# Patient Record
Sex: MALE | Race: BLACK OR AFRICAN AMERICAN | NOT HISPANIC OR LATINO | URBAN - METROPOLITAN AREA
[De-identification: names, ages, dates, MRNs, and addresses within clinical notes are randomized per-mention and may not be internally consistent; named-entity substitution may affect disease eponyms.]

---

## 2018-03-06 ENCOUNTER — OFFICE VISIT CONVERTED (OUTPATIENT)
Dept: INTERNAL MEDICINE | Facility: CLINIC | Age: 58
End: 2018-03-06
Attending: INTERNAL MEDICINE

## 2018-03-08 ENCOUNTER — OFFICE VISIT CONVERTED (OUTPATIENT)
Dept: PULMONOLOGY | Facility: CLINIC | Age: 58
End: 2018-03-08
Attending: INTERNAL MEDICINE

## 2018-05-31 ENCOUNTER — OFFICE VISIT CONVERTED (OUTPATIENT)
Dept: PULMONOLOGY | Facility: CLINIC | Age: 58
End: 2018-05-31
Attending: INTERNAL MEDICINE

## 2018-09-17 ENCOUNTER — OFFICE VISIT CONVERTED (OUTPATIENT)
Dept: INTERNAL MEDICINE | Facility: CLINIC | Age: 58
End: 2018-09-17
Attending: INTERNAL MEDICINE

## 2018-09-17 ENCOUNTER — CONVERSION ENCOUNTER (OUTPATIENT)
Dept: INTERNAL MEDICINE | Facility: CLINIC | Age: 58
End: 2018-09-17

## 2018-09-27 ENCOUNTER — OFFICE VISIT CONVERTED (OUTPATIENT)
Dept: PULMONOLOGY | Facility: CLINIC | Age: 58
End: 2018-09-27
Attending: INTERNAL MEDICINE

## 2018-10-25 ENCOUNTER — OFFICE VISIT CONVERTED (OUTPATIENT)
Dept: PULMONOLOGY | Facility: CLINIC | Age: 58
End: 2018-10-25
Attending: PHYSICIAN ASSISTANT

## 2018-11-06 ENCOUNTER — OFFICE VISIT CONVERTED (OUTPATIENT)
Dept: INTERNAL MEDICINE | Facility: CLINIC | Age: 58
End: 2018-11-06
Attending: INTERNAL MEDICINE

## 2018-11-28 ENCOUNTER — OFFICE VISIT CONVERTED (OUTPATIENT)
Dept: PULMONOLOGY | Facility: CLINIC | Age: 58
End: 2018-11-28
Attending: PHYSICIAN ASSISTANT

## 2018-12-03 ENCOUNTER — OFFICE VISIT CONVERTED (OUTPATIENT)
Dept: SURGERY | Facility: CLINIC | Age: 58
End: 2018-12-03
Attending: UROLOGY

## 2018-12-03 ENCOUNTER — CONVERSION ENCOUNTER (OUTPATIENT)
Dept: SURGERY | Facility: CLINIC | Age: 58
End: 2018-12-03

## 2018-12-18 ENCOUNTER — OFFICE VISIT CONVERTED (OUTPATIENT)
Dept: INTERNAL MEDICINE | Facility: CLINIC | Age: 58
End: 2018-12-18
Attending: PHYSICIAN ASSISTANT

## 2019-01-03 ENCOUNTER — HOSPITAL ENCOUNTER (OUTPATIENT)
Dept: CARDIOLOGY | Facility: HOSPITAL | Age: 59
Discharge: HOME | End: 2019-01-03
Attending: INTERNAL MEDICINE

## 2019-01-16 ENCOUNTER — HOSPITAL ENCOUNTER (OUTPATIENT)
Dept: INFUSION THERAPY | Facility: HOSPITAL | Age: 59
Setting detail: RECURRING SERIES
Discharge: HOME | End: 2019-01-31
Attending: INTERNAL MEDICINE

## 2019-01-25 ENCOUNTER — OFFICE VISIT CONVERTED (OUTPATIENT)
Dept: PULMONOLOGY | Facility: CLINIC | Age: 59
End: 2019-01-25
Attending: PHYSICIAN ASSISTANT

## 2019-02-04 ENCOUNTER — HOSPITAL ENCOUNTER (OUTPATIENT)
Dept: PULMONOLOGY | Facility: CLINIC | Age: 59
Setting detail: RECURRING SERIES
Discharge: HOME | End: 2019-03-03
Attending: INTERNAL MEDICINE

## 2019-02-13 ENCOUNTER — HOSPITAL ENCOUNTER (OUTPATIENT)
Dept: INFUSION THERAPY | Facility: HOSPITAL | Age: 59
Setting detail: RECURRING SERIES
Discharge: HOME | End: 2019-02-28
Attending: INTERNAL MEDICINE

## 2021-05-15 VITALS
HEIGHT: 64 IN | WEIGHT: 211.37 LBS | SYSTOLIC BLOOD PRESSURE: 122 MMHG | OXYGEN SATURATION: 93 % | HEART RATE: 116 BPM | TEMPERATURE: 97.6 F | BODY MASS INDEX: 36.09 KG/M2 | DIASTOLIC BLOOD PRESSURE: 68 MMHG | RESPIRATION RATE: 16 BRPM

## 2021-05-16 VITALS
BODY MASS INDEX: 35.26 KG/M2 | HEIGHT: 64 IN | TEMPERATURE: 97.1 F | WEIGHT: 206.5 LBS | DIASTOLIC BLOOD PRESSURE: 78 MMHG | HEART RATE: 83 BPM | OXYGEN SATURATION: 95 % | RESPIRATION RATE: 18 BRPM | SYSTOLIC BLOOD PRESSURE: 110 MMHG

## 2021-05-16 VITALS
DIASTOLIC BLOOD PRESSURE: 94 MMHG | OXYGEN SATURATION: 94 % | BODY MASS INDEX: 33.72 KG/M2 | HEIGHT: 64 IN | HEART RATE: 110 BPM | WEIGHT: 197.5 LBS | RESPIRATION RATE: 16 BRPM | SYSTOLIC BLOOD PRESSURE: 136 MMHG | TEMPERATURE: 100.3 F

## 2021-05-16 VITALS
OXYGEN SATURATION: 95 % | TEMPERATURE: 97.7 F | HEIGHT: 64 IN | RESPIRATION RATE: 26 BRPM | DIASTOLIC BLOOD PRESSURE: 82 MMHG | SYSTOLIC BLOOD PRESSURE: 136 MMHG | WEIGHT: 213.12 LBS | HEART RATE: 91 BPM | BODY MASS INDEX: 36.38 KG/M2

## 2021-05-16 VITALS — HEIGHT: 64 IN | BODY MASS INDEX: 35.17 KG/M2 | RESPIRATION RATE: 12 BRPM | WEIGHT: 206 LBS

## 2021-05-28 VITALS
SYSTOLIC BLOOD PRESSURE: 140 MMHG | DIASTOLIC BLOOD PRESSURE: 101 MMHG | SYSTOLIC BLOOD PRESSURE: 157 MMHG | HEART RATE: 91 BPM | BODY MASS INDEX: 36.28 KG/M2 | BODY MASS INDEX: 36.19 KG/M2 | HEART RATE: 100 BPM | TEMPERATURE: 97.8 F | WEIGHT: 212.5 LBS | RESPIRATION RATE: 16 BRPM | HEIGHT: 64 IN | BODY MASS INDEX: 35.41 KG/M2 | DIASTOLIC BLOOD PRESSURE: 86 MMHG | SYSTOLIC BLOOD PRESSURE: 132 MMHG | HEIGHT: 64 IN | WEIGHT: 212 LBS | OXYGEN SATURATION: 95 % | WEIGHT: 207.44 LBS | RESPIRATION RATE: 20 BRPM | DIASTOLIC BLOOD PRESSURE: 82 MMHG | OXYGEN SATURATION: 92 % | TEMPERATURE: 97.9 F | HEIGHT: 64 IN | RESPIRATION RATE: 14 BRPM | HEART RATE: 102 BPM | OXYGEN SATURATION: 93 %

## 2021-05-28 VITALS
OXYGEN SATURATION: 95 % | SYSTOLIC BLOOD PRESSURE: 118 MMHG | OXYGEN SATURATION: 98 % | HEART RATE: 103 BPM | RESPIRATION RATE: 20 BRPM | TEMPERATURE: 98.2 F | TEMPERATURE: 97.9 F | WEIGHT: 199.31 LBS | DIASTOLIC BLOOD PRESSURE: 71 MMHG | HEART RATE: 95 BPM | RESPIRATION RATE: 24 BRPM | SYSTOLIC BLOOD PRESSURE: 150 MMHG | BODY MASS INDEX: 33.47 KG/M2 | HEIGHT: 64 IN | WEIGHT: 196.06 LBS | OXYGEN SATURATION: 97 % | DIASTOLIC BLOOD PRESSURE: 61 MMHG | WEIGHT: 205.37 LBS | DIASTOLIC BLOOD PRESSURE: 85 MMHG | HEART RATE: 88 BPM | BODY MASS INDEX: 34.03 KG/M2 | BODY MASS INDEX: 35.06 KG/M2 | TEMPERATURE: 98.7 F | HEIGHT: 64 IN | HEIGHT: 64 IN | SYSTOLIC BLOOD PRESSURE: 146 MMHG | RESPIRATION RATE: 16 BRPM

## 2021-05-28 NOTE — PROGRESS NOTES
Patient: RASHID GROSS     Acct: [id number]     Report: #[id number]  UNIT #: [id number]     : 1960    Encounter Date:2019  PRIMARY CARE: ELEANOR CARRERA  ***Signed***  --------------------------------------------------------------------------------------------------------------------  Chief Complaint      Encounter Date      2019            Primary Care Provider      ELEANOR CARRERA            Referring Provider      ELEANOR CARRERA            Patient Complaint      Patient is complaining of      asthma            VITALS      Height 5 ft 4 in / 162.56 cm      Weight 212 lbs 8 oz / 96.053817 kg      BSA 2.01 m2      BMI 36.5 kg/m2      Temperature 97.9 F / 36.61 C - Oral      Pulse 100      Respirations 16      Blood Pressure 132/86 Sitting, Right Arm      Pulse Oximetry 93%, room air            HPI      The patient is a very pleasant 59 year old  male patient of Dr. Pal's here for follow up. He has been in the hospital recently seen by Dr. Olivera and Dr. Pal on 18. At that time he was having an asthma     exacerbation. He has history of severe persistent asthma, chronic obstructive     pulmonary disease overlap syndrome, history of mucous plugging and is     chronically immunosuppressed on daily steroids. He was treated with breathing     treatments, additional steroids, given Brovana and Pulmicort and DuoNeb and     discharged home on Brovana and Pulmicort which he continues to take for the past    month. He liked the Symbicort  but feels like he does well with the Brovana and     Pulmicort nebulizers. He has not been using Spiriva because he did not     understand he was still supposed to be taking it. It was discussed with the     patient during his hospital and in numerous progress notes about trying to add     anti-IL5 with fasenra or Nucala in addition to his Xolair. The patient was aware    of this but when I discussed this with him again today  he is very hesitant to     get an additional injectable medication in addition to the Xolair. The patient     states that he really does not like shots and he is not sure about that even     though he understands it may help his breathing significantly. He has not had an    IgE recently but his last IgE was 426 on 06/08/18. He previously had peripheral     eosinophilia although recently he was on high doses of steroids when his     eosinophils were checked. The patient states that his breathing is about back to    his baseline now. He has chronic wheezing. He denies purulent sputum production,    hemoptysis, fevers or chills.             I have reviewed his Review of Systems medical, surgical and family history and     agree with those as entered.      Copies To:   Oliver Pal ;            IVONE      Constitutional:  Denies: Fatigue, Fever, Weight gain, Weight loss, Chills,     Insomnia, Other      Respiratory/Breathing:  Complains of: Shortness of air, Wheezing, Cough; Denies:    Hemoptysis, Pleuritic pain, Other      Endocrine:  Denies: Polydipsia, Polyuria, Heat/cold intolerance, Diabetes, Other      Eyes:  Denies: Blurred vision, Vision Changes, Other      Ears, nose, mouth, throat:  Denies: Mouth lesions, Thrush, Throat pain,     Hoarseness, Allergies/Hay Fever, Post Nasal Drip, Headaches, Recent Head Injury,    Nose Bleeding, Neck Stiffness, Thyroid Mass, Hearing Loss, Ear Fullness, Dry     Mouth, Nasal or Sinus Pain, Dry Lips, Nasal discharge, Nasal congestion, Other      Cardiovascular:  Denies: Palpitations, Syncope, Claudication, Chest Pain, Wake     up Gasping for air, Leg Swelling, Irregular Heart Rate, Cyanosis, Dyspnea on     Exertion, Other      Gastrointestinal:  Denies: Nausea, Constipation, Diarrhea, Abdominal pain,     Vomiting, Difficulty Swallowing, Reflux/Heartburn, Dysphagia, Jaundice,     Bloating, Melena, Bloody stools, Other      Genitourinary:  Denies: Urinary frequency, Incontinence,  "Hematuria, Urgency,     Nocturia, Dysuria, Testicular problems, Other      Musculoskeletal:  Denies: Joint Pain, Joint Stiffness, Joint Swelling, Myalgias,    Other      Hematologic/lymphatic:  DENIES: Lymphadenopathy, Bruising, Bleeding tendencies,     Other      Neurological:  Denies: Headache, Numbness, Weakness, Seizures, Other      Psychiatric:  Denies: Anxiety, Appropriate Effect, Depression, Other      Sleep:  No: Excessive daytime sleep, Morning Headache?, Snoring, Insomnia?, Stop    breathing at sleep?, Other      Integumentary:  Denies: Rash, Dry skin, Skin Warm to Touch, Other      Immunologic/Allergic:  Denies: Latex allergy, Seasonal allergies, Asthma,     Urticaria, Eczema, Other      Immunization status:  No: Up to date            FAMILY/SOCIAL/MEDICAL HX      Surgical History:  Yes: Abdominal Surgery (UMBILICAL HERNIA REPAIR), CABG, Head     Surgery (LEFT EYE SX/ORBITAL FX), Orthopedic Surgery (right hip repair, knee     scope); No: Appendectomy, Bladder Surgery, Bowel Surgery, Cholecystectomy, Oral     Surgery, Vascular Surgery      Cancer/Type - Family Hx:  Uncle      Other Family Medical History:  Mother      Is Father Still Living?:  Yes      Is Mother Still Living?:  Yes      Social History:  No Tobacco Use, No Alcohol Use, No Recreational Drug use      Smoking status:  Current every day smoker (.5 ppd x 40 y, a few a week since     5/5/18)      Anticoagulation Therapy:  No      Antibiotic Prophylaxis:  No      Medical History:  Yes: Arthritis (RIGHT  HIP AND KNEE), Asthma, Chronic     Bronchitis/COPD (INHALER, NEBS), Congestive Heart Failu (\"MY DOCTOR GIVES ME A     WATER PILL SOMETIMES\"), Hemorrhoids/Rectal Prob (HERNIA), Shortness Of Breath     (ACUTE RESP FAILURE AUG 2015); No: Blood Disease, Chemotherapy/Cancer, Deafness     or Ringing Ears, Diabetes, Seizures, Heart Attack, High Blood Pressure, Sinus     Trouble, Miscellaneous Medical/oth            PREVENTION      Hx Influenza " "Vaccination:  Yes (2018)      Date Influenza Vaccine Given:  Nov 1, 2018      Influenza Vaccine Declined:  Yes      2 or More Falls Past Year?:  No      Fall Past Year with Injury?:  No      Hx Pneumococcal Vaccination:  No      Encouraged to follow-up with:  PCP regarding preventative exams.      Chart initiated by      Shana Marquez ma            ALLERGIES/MEDICATIONS      Allergies:        Coded Allergies:             NO KNOWN DRUG ALLERGIES (Verified  Allergy, Unknown, 1/25/19)           MORPHINE (Verified  Adverse Reaction, Unknown, nausea, \"feel funny\",     1/25/19)      Medications    Last Reconciled on 1/25/19 16:22 by MILLER BARBOUR      Tiotropium Bromide (Spiriva Respimat 2.5 mcg/Puff) 4 Gm Mist.inhal      2 PUFFS INH RTQDAY, #1 MDI 5 Refills         Prov: Cammy Hughes PA-C         1/25/19       MDI-Albuterol (Ventolin HFA) 8 Gm Hfa.aer.ad      2 PUFFS INH Q4H PRN for SHORTNESS OF BREATH, #1 MDI 0 Refills         Prov: [facility]         12/11/18       Neb-Budesonide (Budesonide) 0.5 Mg/2 Ml Ampul.neb      0.5 MG INH RTBID for 30 Days, #60 NEB         Prov: [facility]         12/11/18       Arformoterol Tartrate (Brovana) 15 Mcg/2 Ml Vial.neb      15 MCG INH RTBID for 30 Days, #60 NEB         Prov: [facility]         12/11/18       predniSONE* (predniSONE*) 10 Mg Tablet      30 MG PO QDAY for 28 Days, #126 TAB         Prov: [facility]         12/11/18       Azithromycin (Azithromycin) 250 Mg Tablet      250 MG PO QDAY for 4 Days, #4 TAB         Prov: [facility]         12/11/18       Amoxicillin (Amoxicillin) 500 Mg Capsule      1000 MG PO TID for 6 Days, #36 CAP         Prov: [facility]         12/11/18       Promethazine/Codeine (Phenergan w/Codeine 6.25-10 MG/5ML*) 240 Ml Syrup      5 ML PO Q6H PRN for COUGH for 14 Days, #280 ML 0 Refills         Prov: [facility]         12/11/18     "   Albuterol/Ipratropium (Duoneb) 3 Ml Ampul.neb      3 ML INH RTQ4H, #180 NEB 0 Refills         Reported         12/6/18       Ergocalciferol (Ergocalciferol*) 50,000 Unit Capsule      07[address] UNITS PO Q7DAY, #4 CAP 0 Refills         Reported         12/6/18       Montelukast Sodium (Singulair*) 10 Mg Tablet      10 MG PO HS, #30 TAB 6 Refills         Prov: Cammy Hughes PA-C         10/25/18       PSEUDOEPHEDRINE HCl (Sudogest) 60 Mg Tablet      60 MG PO Q6H PRN for CONGESTION, #120 TAB 6 Refills         Prov: Oliver Pal         9/27/18       Epinephrine HCl (Epipen 2-Chung) 0.3 Mg/0.3 Ml Auto.injct      0.3 MG IM ASDIR for 2 Days, #2 SYRINGE 11 Refills         Prov: [facility] SO         9/13/18      Current Medications      Current Medications Reviewed 1/25/19            EXAM      GEN-patient appears stated age resting comfortable in no acute distress      Eyes-PERRL,  conjunctiva are normal in appearance extraocular muscles are in    tact, no scleral icterus      Nasal-both nares are patent turbinates appear normal no polyps seen no nasal     discharge or ulcerations      Ears-tympanic membranes are normal no erythema no bulging, normal to inspection      Lymphatic-no swollen or enlarged cervical nodes, or axillary node, or femoral     nodes, or supraclavicular nodes      Mouth normal dentition, no erythema no ulcerations oropharynx appears normal no     exudate no evidence of postnasal drip, MP(default value)      Neck-there are no palpable supraclavicular or cervical adenopathy, thyroid is     normal in appearance no apparent nodules, there is no inspiratory or expiratory     stridor      Respiratory-scattered expiratory wheezing throughout, no rhonchi or crackles     appreciated, normal work of breathing noted.       Cardiovascular-the heart rate is normal and regular S1 and S2 present with no     murmur or extra heart sounds, there is no JVD or pedal edema present      GI-the abdomen is normal in  appearance, bowel sounds present and normal in all     quadrants no hepatosplenomegaly or masses felt      Extremities-no clubbing is present, pulses present in all extremities, capillary    refill time is normal      Musculoskeletal-Normal strength in upper and lower extremities, inspection shows    no evidence of muscle atrophy      Skin-skin is normal in appearance it is warm and dry, no rashes present, no     evidence of cyanosis, palpation reveals no masses      Neurological-the patient is alert and oriented to time place and person, moves     all 4 extremities, normal gait, normal affect and mood, CN2-12 intact      Psych-normal judgment and insight is good, normal mood and affect, alert and     oriented to person, place, and time, and date      Vtials      Vitals:             Height 5 ft 4 in / 162.56 cm           Weight 212 lbs 8 oz / 96.016337 kg           BSA 2.01 m2           BMI 36.5 kg/m2           Temperature 97.9 F / 36.61 C - Oral           Pulse 100           Respirations 16           Blood Pressure 132/86 Sitting, Right Arm           Pulse Oximetry 93%, room air            REVIEW      Results Reviewed      P[facility]S Results Reviewed?:  Yes Prev Lab Results, Yes Prev Radiology Results, Yes     Previous [facility] Records (I personally reviewed the patient's recent pulmonary     consultaion, progress notes and discharge summary. )            Assessment      Severe persistent asthma - J45.50            Severe persistent allergic asthma - J45.50            Wheezing - R06.2            Notes      New Medications      * TIOTROPIUM BROMIDE (Spiriva Respimat 2.5 mcg/Puff) 4 GM MIST.INHAL: 2 PUFFS       INH RTQDAY #1      * PANTOPRAZOLE (Protonix*) 20 MG TABLET: 20 MG PO QDAY@07 #30         Instructions: Take on an empty stomach.      New Diagnostics      * CBC, 2 Week         Dx: Severe persistent asthma - J45.50      * Immunoglobulin  E (I, 2 Week         Dx: Severe persistent asthma - J45.50      New Referrals       * Pulmonary Rehab, Routine         Dx: Severe persistent asthma - J45.50      ASSESSMENT:      1. Severe persistent asthma with recent acute exacerbation.       2. Asthma chronic obstructive pulmonary disease overlap syndrome.      3. Peripheral eosinophilia.      4. Elevated IgE level.      5. Chronic cough, worse at night.      6. Chronic immunosuppression with chronic steroids.              PLAN:       1. At this time the patient seems to be doing better on Brovana and Pulmicort     nebulizers so we will continue these twice daily and add Spiriva Respimat 2.5.     He has Medicare so we will have the Brovana and Pulmicort sent through [facility]     to make them more affordable.        2. Continue daily Prednisone 10 mg daily.  Continue Singulair and daily     Azithromycin.       3. I have discussed with the patient doing additional blood work now that he is     on a lower dose of steroids including rechecking an IgE and CBC to evaluate for     peripheral eosinophilia. If he qualifies and is agreeable, we can try adding     fasenra or Nucala in addition to his Xolair injections but right now he is     hesitant  and is not sure he would like to do another shot.       4. The patient is up to date on his flu and pneumonia vaccines.       5. I have discussed pulmonary rehabilitation with the patient today as I think     he would significantly benefit from this.  He is in agreement with doing that.       6. The patient as given a prescription for codeine cough syrup that he has been     on chronically given his chronic cough at night.       7. I will have him followup in 2-3 months with Dr. Pal or sooner if needed.            Patient Education      Patient Education Provided:  Acute Asthma, How to use an Inhaler, How to use a     Nebulizer      Time Spent:  > 50% /[facility]                 Disclaimer: Converted document may not contain table formatting or lab diagrams. Please see [employer] for the authenticated document.

## 2021-05-28 NOTE — PROGRESS NOTES
Patient: RASHID GROSS     Acct: [id number]     Report: #[id number]  UNIT #: [id number]     : 1960    Encounter Date:10/25/2018  PRIMARY CARE: ELEANOR CARRERA  ***Signed***  --------------------------------------------------------------------------------------------------------------------  Chief Complaint      Encounter Date      Oct 25, 2018            Primary Care Provider      ELEANOR CARRERA            Referring Provider      ELEANOR CARRERA            Patient Complaint      Patient is complaining of      Pt here for 1 month follow up/COPD            VITALS      Height 5 ft 4 in / 162.56 cm      Weight 207 lbs 7 oz / 94.578169 kg      BSA 1.99 m2      BMI 35.6 kg/m2      Temperature 97.8 F / 36.56 C - Oral      Pulse 91      Respirations 20      Blood Pressure 140/82 Sitting, Left Arm      Pulse Oximetry 95%, room air            HPI      The patient is a pleasant 58-year-old  male who is a patient of     Dr. Demarco aguillon with a history of severe persistent asthma previously on Xolair     infusions that were stopped for several months due to insurance and hospital     issues that have now been restarted just in the past several weeks. He has now     had one Xolair infusion this month. At his last visit one month ago he was hav    ing severely uncontrolled asthma with increased wheezing, coughing, and dyspnea.    He was started on Symbicort 160 and Qvar 80 and had his prednisone increased to     30 mg daily. He was also using albuterol as needed. He now tells me he is doing     better than he was doing at his last visit. His wheezing has improved. His cough    has improved. During the day, he typically does not cough much at all but he has    persistent nocturnal coughing and typically takes codeine/Phenergan cough syrup     that we provide for him to suppress his nocturnal coughing. He is also on     Spiriva Respimat 2.5 and feels that all of these are helping him. He denies      hemoptysis, fever, or chills. He is also on Singulair and daily azithromycin and    is running out of these. He has had issues with insurance coverage here recently    but his insurance coverage is changing November 1st, so he will have better     prescription drug coverage.            I have reviewed his review of systems, medical, surgical, and family history and    agree with those as entered.      Copies To:   Oliver Pal ;            IVONE      Constitutional:  Denies: Fatigue, Fever, Weight gain, Weight loss, Chills,     Insomnia, Other      Respiratory/Breathing:  Complains of: Shortness of air, Wheezing, Cough; Denies:    Hemoptysis, Pleuritic pain, Other      Endocrine:  Denies: Polydipsia, Polyuria, Heat/cold intolerance, Diabetes, Other      Eyes:  Denies: Blurred vision, Vision Changes, Other      Ears, nose, mouth, throat:  Denies: Congestion, Dysphagia, Hearing Changes, Nose    Bleeding, Nasal Discharge, Throat pain, Tinnitus, Other      Cardiovascular:  Denies: Chest Pain, Exertional dyspnea, Peripheral Edema,     Palpitations, Syncope, Wake up Gasping for air, Orthopnea, Tachycardia, Other      Gastrointestinal:  Denies: Abdominal pain/cramping, Bloody stools, Constipation,    Diarrhea, Melena, Nausea, Vomiting, Other      Genitourinary:  Denies: Dysuria, Urinary frequency, Incontinence, Hematuria,     Urgency, Other      Musculoskeletal:  Denies: Joint Pain, Joint Stiffness, Joint Swelling, Myalgias,    Other      Hematologic/lymphatic:  DENIES: Lymphadenopathy, Bruising, Bleeding tendencies,     Other      Neurologic:  Denies: Headache, Numbness, Weakness, Seizures, Other      Psychiatric:  Denies: Anxiety, Appropriate Effect, Depression, Other      Sleep:  No: Excessive daytime sleep, Morning Headache?, Snoring, Insomnia?, Stop    breathing at sleep?, Other      Integumentary:  Denies: Rash, Dry skin, Skin Warm to Touch, Other            FAMILY/SOCIAL/MEDICAL HX      Surgical History:  Yes:  "Abdominal Surgery (UMBILICAL HERNIA REPAIR), CABG, Head     Surgery (LEFT EYE SX/ORBITAL FX), Orthopedic Surgery (right hip repair, knee     scope); No: Appendectomy, Bladder Surgery, Bowel Surgery, Cholecystectomy, Oral     Surgery, Vascular Surgery      Cancer/Type - Family Hx:  Uncle      Other Family Medical History:  Mother      Is Father Still Living?:  Yes      Is Mother Still Living?:  Yes      Social History:  No Tobacco Use, No Alcohol Use, No Recreational Drug use      Smoking status:  Current every day smoker (.5 ppd x 40 y, a few a week since     5/5/18)      Anticoagulation Therapy:  No      Antibiotic Prophylaxis:  No      Medical History:  Yes: Arthritis (RIGHT  HIP AND KNEE), Asthma, Chronic     Bronchitis/COPD (INHALER, NEBS), Congestive Heart Failu, Shortness Of Breath     (ACUTE RESP FAILURE AUG 2015), Miscellaneous Medical/oth (Andrez Inguinal Hernia);     No: Blood Disease, Chemotherapy/Cancer, Deafness or Ringing Ears, Diabetes,     Seizures, Heart Attack, Hemorrhoids/Rectal Prob, High Blood Pressure, Sinus     Trouble      Psychiatric History      None            PREVENTION      Hx Influenza Vaccination:  No      Influenza Vaccine Declined:  Yes      2 or More Falls Past Year?:  No      Fall Past Year with Injury?:  No      Hx Pneumococcal Vaccination:  No      Encouraged to follow-up with:  PCP regarding preventative exams.      Chart initiated by      Jacqueline Zazueta MA            ALLERGIES/MEDICATIONS      Allergies:        Coded Allergies:             MORPHINE (Verified  Adverse Reaction, Unknown, nausea, \"feel funny\",     10/25/18)      Medications    Last Reconciled on 10/25/18 11:01 by MILLER BARBOUR      Montelukast Sodium (Singulair*) 10 Mg Tablet      10 MG PO HS, #30 TAB 6 Refills         Prov: Cammy Hughes PA-C         10/25/18       Azithromycin (Azithromycin) 250 Mg Tablet      250 MG PO QDAY for 30 Days, #30 TAB         Prov: Cammy Hughes PA-C         10/25/18     "   predniSONE* (predniSONE*) 10 Mg Tablet      30 MG PO QDAY, #90 TAB 2 Refills         Prov: Oliver Pal         9/27/18       PSEUDOEPHEDRINE HCl (Sudogest) 60 Mg Tablet      60 MG PO Q6H PRN for CONGESTION, #120 TAB 6 Refills         Prov: Oliver Pal         9/27/18       Beclomethasone Dipropionate (Qvar 80 Redihaler 10.6 GM) 10.6 Gm Hfa.aeroba      2 PUFF INH RTBID, #1 INH         Prov: Oliver Pal         9/27/18       Tiotropium Bromide (Spiriva Respimat 2.5 mcg/Puff) 4 Gm Mist.inhal      2 PUFFS INH QDAY, #1 MDI 11 Refills         Prov: Oliver Pal         9/27/18       Budesonide/Formoterol Fumarate (Symbicort 160/4.5 Mcg) 10.2 Gm Inh      2 PUFF INH BID, #1 INH 11 Refills         Prov: Oliver Pal         9/27/18       Pantoprazole (Protonix*) 20 Mg Tablet.dr      20 MG PO HS for 30 Days, #30 TAB.SR         Prov: MILAGRO SO         9/13/18       (Promethazine/Cod Syrup) 5 ML SYRP No Conflict Check      5 ML PO Q4H PRN for COUGH for 3 Days, #120 ml         Prov: MILAGRO SO         9/13/18       Neb-Budesonide (Budesonide) 0.5 Mg/2 Ml Ampul.neb      0.5 MG INH RTBID for 30 Days, #60 NEB         Prov: MILAGRO SO         9/13/18       Guaifenesin (Humibid La*) 600 Mg Tab.er.12h      1200 MG PO BID for 10 Days, #40 TAB.ER         Prov: MILAGRO SO         9/13/18       Benzonatate (Benzonatate) 100 Mg Capsule      200 MG PO TID for 7 Days, #42 CAP         Prov: MILAGRO SO         9/13/18       Bisoprolol Fumarate (Bisoprolol Fumarate) 5 Mg Tablet      5 MG PO QDAY for 30 Days, #30 TAB         Prov: MILAGRO SO         9/13/18       Arformoterol Tartrate (Brovana) 15 Mcg/2 Ml Vial.neb      15 MCG INH RTBID for 30 Days, #60 NEB         Prov: MILAGRO SO         9/13/18       Epinephrine HCl (Epipen 2-Chung) 0.3 Mg/0.3 Ml Auto.injct      0.3 MG IM ASDIR for 2 Days, #2 SYRINGE 11 Refills         Prov: MILAGRO SO         9/13/18       predniSONE* (predniSONE*) 5 Mg/5 Ml Solution      10 MG PO QDAY, ML          Reported         9/11/18       MDI-Albuterol (Ventolin HFA*) 18 Gm Hfa.aer.ad      2 PUFFS INH Q4H PRN for SHORTNESS OF BREATH, #1 INH 9 Refills         Prov: Oliver Pal         5/31/18       Aspirin Chew (Aspirin Baby) 81 Mg Tab.chew      81 MG PO QDAY, #30 TAB.CHEW 0 Refills         Reported         5/19/18       Albuterol/Ipratropium (Duoneb) 3 Ml Ampul.neb      3 ML INH RTQ4H, #180 NEB 11 Refills         Prov: DemarcoOliver         3/8/18      Current Medications      Current Medications Reviewed 10/25/18            EXAM      GEN-patient appears stated age resting comfortable in no acute distress      Eyes-PERRL,  conjunctiva are normal in appearance extraocular muscles are intact,    no scleral icterus      Nasal-both nares are patent turbinates appear normal no polyps seen no nasal     discharge or ulcerations      Ears-tympanic membranes are normal no erythema no bulging, normal to inspection      Lymphatic-no swollen or enlarged cervical nodes, or axillary node, or femoral     nodes, or supraclavicular nodes      Mouth normal dentition, no erythema no ulcerations oropharynx appears normal no     exudate no evidence of postnasal drip      Neck-there are no palpable supraclavicular or cervical adenopathy, thyroid is     normal in appearance no apparent nodules, there is no inspiratory or expiratory     stridor      Respiratory-lungs have mildly wheezing throughout, no rhonchi or crackles     appreciated, normal work of breathing noted       Cardiovascular-the heart rate is normal and regular S1 and S2 present with no     murmur or extra heart sounds, there is no JVD or pedal edema present      GI-the abdomen is normal in appearance, bowel sounds present and normal in all     quadrants no hepatosplenomegaly or masses felt      Extremities-no clubbing is present, pulses present in all extremities, capillary    refill time is normal      Musculoskeletal-Normal strength in upper and lower  extremities, inspection shows    no evidence of muscle atrophy      Skin-skin is normal in appearance it is warm and dry, no rashes present, no     evidence of cyanosis, palpation reveals no masses      Neurological-the patient is alert and oriented to time place and person, moves     all 4 extremities, normal gait, normal affect and mood, CN2-12 intact      Psych-normal judgment and insight is good, normal mood and affect, alert and     oriented to person, place, and time, and date      Vitals      Vitals:             Height 5 ft 4 in / 162.56 cm           Weight 207 lbs 7 oz / 94.889483 kg           BSA 1.99 m2           BMI 35.6 kg/m2           Temperature 97.8 F / 36.56 C - Oral           Pulse 91           Respirations 20           Blood Pressure 140/82 Sitting, Left Arm           Pulse Oximetry 95%, room air            REVIEW      Results Reviewed      PCCS Results Reviewed?:  Yes Prev Lab Results, Yes Prev Radiology Results, Yes     Previous [facility] Records      Lab Results      I have personally reviewed previous lab work, imaging, and provider notes.            Assessment      Notes      New Medications      * MONTELUKAST SODIUM (Singulair*) 10 MG TABLET: 10 MG PO HS #30      Renewed Medications      * Azithromycin 250 MG TABLET: 250 MG PO QDAY 30 Days #30      ASSESSMENT:      1. Severe persistent asthma with control improving.      2. Peripheral eosinophilia.      3. Elevated IgE level.      4. Chronic cough, worse at night.      5. Chronic steroid dependence.            PLAN:       1. At this time, the patient is now back on anti-IL5 therapy with Xolair     injections. He has had one dose now and did significantly better in the past     when he was on this chronically.       2. I will continue him on Symbicort 160, Qvar 80, Spiriva Respimat 2.5, daily     azithromycin, and Singulair. He is doing better on all of these. He will use     p.r.n. albuterol.      3. He should continue on the codeine/Phenergan  cough syrup nocturnally for cough    suppression. We have provided a prescription for this today.       4. He is up-to-date on vaccinations, has already had a flu vaccine this year.       5. I will have him followup again in 2-3 months to see how  he is doing once he     has been back on the Xolair for a little bit longer. He reports that he was     quite well controlled in the past when he was on the Xolair, so if that is the     case, he may be able to continue just on that along with the other inhaler and     medication regimen outlined above. Otherwise if he is not well controlled once     he has been back on Xolair for several months, we can consider adding another     anti-IL5 agent such as Nucala or Fasenra in addition to the Xolair.       6. I will have him followup in 2 months with Dr. Pal or sooner if needed.            Patient Education      Patient Education Provided:  Acute Asthma, How to use an Inhaler, How to use a     Nebulizer      Time Spent:  > 50% /[facility]                 Disclaimer: Converted document may not contain table formatting or lab diagrams. Please see [employer] [facility] for the authenticated document.

## 2021-05-28 NOTE — PROGRESS NOTES
Patient: RASHID GROSS     Acct: [id number]     Report: #[id number]  UNIT #: [id number]     : 1960    Encounter Date:2018  PRIMARY CARE: ELEANOR CARRERA  ***Signed***  --------------------------------------------------------------------------------------------------------------------  Chief Complaint      Encounter Date      May 31, 2018            Primary Care Provider      ELEANOR CARRERA            Referring Provider      ELEANOR CARRERA            Patient Complaint      Patient is complaining of      Pt here for 4m f/u, copd, cough, soa            VITALS      Height 5 ft 4 in / 162.56 cm      Weight 196 lbs 1 oz / 88.56946 kg      BSA 2.04 m2      BMI 33.7 kg/m2      Temperature 98.7 F / 37.06 C - Oral      Pulse 88      Respirations 16      Blood Pressure 118/71 Sitting, Right Arm      Pulse Oximetry 97%, Room air            HPI      The patient is a very pleasant 58 year old -American male here today for     follow up.  The patient is doing well on Xolair, feels that his symptoms are     much improved, less wheezing, uses his rescue inhaler less frequently. The     patient is smoking one cigarette per week. He had a surgery since last office     visit. He has had no respiratory complaints since then.  He uses his rescue     inhaler still daily, however his prednisone dose has decreased significantly.      He still has cough, but wheezing much improved.            ROS      Constitutional:  Denies: Fatigue, Fever, Weight gain, Weight loss, Chills,     Insomnia, Other      Respiratory/Breathing:  Complains of: Wheezing, Denies: Shortness of air, Cough    , Hemoptysis, Pleuritic pain, Other      Endocrine:  Denies: Polydipsia, Polyuria, Heat/cold intolerance, Diabetes, Other      Eyes:  Denies: Blurred vision, Vision Changes, Other      Ears, nose, mouth, throat:  Denies: Congestion, Dysphagia, Hearing Changes,     Nose Bleeding, Nasal Discharge, Throat pain, Tinnitus, Other       Cardiovascular:  Denies: Chest Pain, Exertional dyspnea, Peripheral Edema,     Palpitations, Syncope, Wake up Gasping for air, Orthopnea, Tachycardia, Other      Gastrointestinal:  Denies: Abdominal pain/cramping, Bloody stools, Constipation    , Diarrhea, Melena, Nausea, Vomiting, Other      Genitourinary:  Denies: Dysuria, Urinary frequency, Incontinence, Hematuria,     Urgency, Other      Musculoskeletal:  Denies: Joint Pain, Joint Stiffness, Joint Swelling, Myalgias    , Other      Hematologic/lymphatic:  DENIES: Lymphadenopathy, Bruising, Bleeding tendencies,     Other      Neurologic:  Denies: Headache, Numbness, Weakness, Seizures, Other      Psychiatric:  Denies: Anxiety, Appropriate Effect, Depression, Other      Sleep:  No: Excessive daytime sleep, Morning Headache?, Snoring, Insomnia?,     Stop breathing at sleep?, Other      Integumentary:  Denies: Rash, Dry skin, Skin Warm to Touch, Other            FAMILY/SOCIAL/MEDICAL HX      Surgical History:  Yes: Abdominal Surgery (UMBILICAL HERNIA REPAIR), CABG, Head     Surgery (LEFT EYE SX/ORBITAL FX), Orthopedic Surgery (right hip repair), No:     Appendectomy, Bladder Surgery, Bowel Surgery, Cholecystectomy, Oral Surgery,     Vascular Surgery      Cancer/Type - Family Hx:  Uncle      Other Family Medical History:  Mother      Is Father Still Living?:  Yes      Is Mother Still Living?:  Yes      Social History:  No Tobacco Use, No Alcohol Use, No Recreational Drug use      Smoking status:  Current every day smoker (.5 ppd x 40 y, a few a week since 5/5 /18)      Anticoagulation Therapy:  No      Antibiotic Prophylaxis:  No      Medical History:  Yes: Arthritis (RIGHT  HIP AND KNEE), Asthma, Chronic     Bronchitis/COPD (INHALER, NEBS), Congestive Heart Failu, Shortness Of Breath (    ACUTE RESP FAILURE AUG 2015), Miscellaneous Medical/oth (Andrez Inguinal Hernia),     No: Blood Disease, Chemotherapy/Cancer, Deafness or Ringing Ears, Diabetes,     Seizures,  Heart Attack, Hemorrhoids/Rectal Prob, High Blood Pressure, Sinus     Trouble      Psychiatric History      Anxiety            PREVENTION      Hx Influenza Vaccination:  Yes      Date Influenza Vaccine Given:  Jan 1, 2018      Influenza Vaccine Declined:  Yes      2 or More Falls Past Year?:  No      Fall Past Year with Injury?:  No      Hx Pneumococcal Vaccination:  No      Encouraged to follow-up with:  PCP regarding preventative exams.      Chart initiated by      Aurora Nixon MA            ALLERGIES/MEDICATIONS      Allergies:        Coded Allergies:             NO KNOWN ALLERGIES (Unverified , 5/31/18)      Medications    Last Reconciled on 5/31/18 13:57 by OLIVER WEI MD      Doxycycline Monohydrate (Doxycycline Monohydrate) 100 Mg Tablet      100 MG PO BID, TAB         Reported         5/19/18       Aspirin (Aspirin Baby *) 81 Mg Tab.chew      81 MG PO QDAY, #30 TAB.CHEW 0 Refills         Reported         5/19/18       Albuterol/Ipratropium (Duoneb) 3 Ml Ampul.neb      3 ML INH RTQ4H, #180 NEB 11 Refills         Prov: Oliver Wei         3/8/18       Tiotropium Bromide (Spiriva Respimat 2.5 mcg/Puff) 4 Gm Mist.inhal      2 PUFFS INH QDAY, #1 MDI 11 Refills         Prov: Oliver Wie         3/8/18       MDI-Albuterol (Ventolin HFA*) 18 Gm Hfa.aer.ad      2 PUFFS INH Q4H Y for SHORTNESS OF BREATH, #1 INH 9 Refills         Prov: Oliver Wei         3/8/18       predniSONE* (predniSONE*) 5 Mg Tablet      5 MG PO QDAY, #30 TAB 3 Refills         Prov: Oliver Wei         12/18/17       PSEUDOEPHEDRINE HCl (Sudogest) 60 Mg Tablet      60 MG PO TID for 30 Days, #90 TAB 3 Refills         Prov: Oliver Wei         11/27/17       Epinephrine HCl (Epipen 2-Chung) 0.3 Mg/0.3 Ml Auto.injct      0.3 MG IM ASDIR, #2 SYRINGE 11 Refills         Prov: Oliver Wei         6/14/17       Montelukast Sodium (Singulair*) 10 Mg Tablet      10 MG PO HS, #30 TAB 0 Refills         Reported         12/2/15       Current Medications      Current Medications Reviewed 5/31/18            EXAM      GEN-patient appears stated age resting comfortable in no acute distress      Eyes-PERRL,  conjunctiva are normal in appearance extraocular muscles are intact    , no scleral icterus      Lymphatic-no swollen or enlarged cervical nodes, or axillary node, or femoral     nodes, or supraclavicular nodes      Mouth normal dentition, no erythema no ulcerations oropharynx appears normal no     exudate no evidence of postnasal drip, MP2      Neck-there are no palpable supraclavicular or cervical adenopathy, thyroid is     normal in appearance no apparent nodules, there is no inspiratory or expiratory     stridor      Respiratory-patient exhibits normal work of breathing, speaking in full     sentences without difficulty, the chest is normal in appearance, clear to     auscultation with no wheezes rales or rhonchi, chest is normal to percussion on     both the right and left sides      Cardiovascular-the heart rate is normal and regular S1 and S2 present with no     murmur or extra heart sounds, there is no JVD or pedal edema present      GI-the abdomen is normal in appearance, bowel sounds present and normal in all     quadrants no hepatosplenomegaly or masses felt      Extremities-no clubbing is present, pulses present in all extremities,     capillary refill time is normal      skin-skin is normal in appearance it is warm and dry, no rashes present, no     evidence of cyanosis, palpation reveals no masses      Neurological-the patient is alert and oriented to time place and person, moves     all 4 extremities, gait assisted with a cane, normal affect and mood, CN2-12     intact      Psych-normal judgment and insight is good, normal mood and affect, alert and     oriented to person, place, and time, and date      Vitals      Vitals:             Height 5 ft 4 in / 162.56 cm           Weight 196 lbs 1 oz / 88.54619 kg           BSA 2.04 m2            BMI 33.7 kg/m2           Temperature 98.7 F / 37.06 C - Oral           Pulse 88           Respirations 16           Blood Pressure 118/71 Sitting, Right Arm           Pulse Oximetry 97%, Room air            REVIEW      Results Reviewed      PCCS Results Reviewed?:  Yes Prev Lab Results, Yes Prev Radiology Results, Yes     Previous [facility] Records            Assessment      Notes      New Medications      * Budesonide/Formoterol Fumarate (Symbicort 160/4.5 Mcg) 10.2 GM INH: 2 PUFF     INH BID #1      * MDI-Albuterol (Ventolin HFA*) 18 GM HFA.AER.AD: 2 PUFFS INH Q4H PRN SHORTNESS     OF BREATH #1      * PSEUDOEPHEDRINE HCl (Sudogest*) 30 MG TABLET: 60 MG PO Q6H WA #120      * Azithromycin (Zithromax) 250 MG TABLET: 250 MG PO QDAY #30      * Budesonide/Formoterol Fumarate (Symbicort 160/4.5 Mcg) 10.2 GM INH: 2 PUFF     INH BID #1      Discontinued Medications      * Beclomethasone Dipropionate (QVAR 80 Mcg) 8.7 GM AER.W.ADAP: 2 PUFFS INH     RTBID #1      * Albuterol/Ipratropium (Duoneb) 3 ML NEBU: 3 ML INH RTQ4H #120      * TIOTROPIUM BROMIDE (Spiriva Respimat 2.5 mcg/Puff) 4 GM MIST.INHAL: 2 PUFFS     INH QDAY #1      * Beclomethasone Dipropionate (QVAR 80 Mcg) 8.7 GM AER.W.ADAP: 1 PUFFS INH BID #    1      * Fluticasone/Vilanterol 200-25 Mcg Inh (Breo Ellipta 200-25 Mcg Inh) 1 EACH     BLST.W.DEV: 1 PUFF INH QDAY 30 Days #1      ASSESSMENT/PLAN:       1.  Severe persistent uncontrolled asthma.  Symptoms much better on Xolair.      Continue Xolair, Symbicort, Spiriva, prednisone and continue to wean.      2. Allergic rhinitis.  Continue Sudogest as well as Singulair.      3. Tobacco abuse.  Still stressed to the point of smoking cessation with the     patient today. The patient is trying to quit, he is down to one cigarette per     week at this time.      4.  I have personally reviewed laboratory data, imaging as well as previous     medical records.            Patient Education      Education resources  provided:  Yes      Patient Education Provided:  Acute Asthma                 Disclaimer: Converted document may not contain table formatting or lab diagrams. Please see [employer] [facility] for the authenticated document.

## 2021-05-28 NOTE — PROGRESS NOTES
Patient: RASHID GROSS     Acct: [id number]     Report: #[id number]  UNIT #: [id number]     : 1960    Encounter Date:2018  PRIMARY CARE: ELEANOR CARRERA  ***Signed***  --------------------------------------------------------------------------------------------------------------------  Chief Complaint      Encounter Date      Mar 8, 2018            Referring Provider      ELEANOR CARRERA            Patient Complaint      Patient is complaining of      Pt here for 3 mth fu            VITALS      Height 5 ft 4 in / 162.56 cm      Weight 199 lbs 5 oz / 90.738584 kg      BSA 2.06 m2      BMI 34.2 kg/m2      Temperature 97.9 F / 36.61 C - Oral      Pulse 103      Respirations 24      Blood Pressure 146/61 Sitting, Right Arm      Pulse Oximetry 98%, room air            HPI      The patient is a very pleasant 58 year old -American male who has a     history of asthma who is here today for follow up.  The patient has improved     significantly since starting on Xolair. He is no longer having to take high     doses of steroids, is still taking prednisone 15 mg daily.  The patient still     has daily shortness of breath, but much better and is able to do all of his     ADLs.  He still has severe persistent coughing that has improved with the use     of Tussionex, using the Tussionex sparingly, but does have to use it on a daily     basis. The patient is still smoking, reports smoking less than one half pack of     cigarettes on a daily basis, but is still smoking.  The patient says he tries     to go without smoking and is trying to cut back, but is having difficulties.      He is still dyspneic, but able to do his ADLs.  Dyspnea is not associated with     any chest pains, heart palpitations or lower extremity edema. It is associated     with some chest tightness and wheezing, does improve with the use of     bronchodilator therapies, does improve with rest.  Overall, the patient has     noticed  significant improvement since being on Xolair infusions.            ROS      Constitutional:  Denies: Fatigue, Fever, Weight gain, Weight loss, Chills,     Insomnia, Other      Respiratory/Breathing:  Complains of: Shortness of air, Wheezing, Cough, Denies    : Hemoptysis, Pleuritic pain, Other      Endocrine:  Denies: Polydipsia, Polyuria, Heat/cold intolerance, Diabetes, Other      Eyes:  Denies: Blurred vision, Vision Changes, Other      Ears, nose, mouth, throat:  Denies: Congestion, Dysphagia, Hearing Changes,     Nose Bleeding, Nasal Discharge, Throat pain, Tinnitus, Other      Cardiovascular:  Denies: Chest Pain, Exertional dyspnea, Peripheral Edema,     Palpitations, Syncope, Wake up Gasping for air, Orthopnea, Tachycardia, Other      Gastrointestinal:  Denies: Abdominal pain/cramping, Bloody stools, Constipation    , Diarrhea, Melena, Nausea, Vomiting, Other      Genitourinary:  Denies: Dysuria, Urinary frequency, Incontinence, Hematuria,     Urgency, Other      Musculoskeletal:  Denies: Joint Pain, Joint Stiffness, Joint Swelling, Myalgias    , Other      Hematologic/lymphatic:  DENIES: Lymphadenopathy, Bruising, Bleeding tendencies,     Other      Neurologic:  Denies: Headache, Numbness, Weakness, Seizures, Other      Psychiatric:  Denies: Anxiety, Appropriate Effect, Depression, Other      Sleep:  No: Excessive daytime sleep, Morning Headache?, Snoring, Insomnia?,     Stop breathing at sleep?, Other      Integumentary:  Denies: Rash, Dry skin, Skin Warm to Touch, Other            FAMILY/SOCIAL/MEDICAL HX      Surgical History:  Yes: Abdominal Surgery (UMBILICAL HERNIA REPAIR), CABG, Head     Surgery (LEFT EYE SX/ORBITAL FX), No: Appendectomy, Bladder Surgery, Bowel     Surgery, Cholecystectomy, Oral Surgery, Orthopedic Surgery, Vascular Surgery      Cancer/Type - Family Hx:  Uncle      Other Family Medical History:  Mother      Is Father Still Living?:  Yes      Is Mother Still Living?:  Yes      Social  History:  No Tobacco Use, No Alcohol Use, No Recreational Drug use      Smoking status:  Current every day smoker (.5 ppd x 40 y )      Anticoagulation Therapy:  No      Antibiotic Prophylaxis:  No      Medical History:  Yes: Arthritis (RIGHT  HIP AND KNEE), Asthma, Chronic     Bronchitis/COPD (INHALER, NEBS), Congestive Heart Failu, Shortness Of Breath (    ACUTE RESP FAILURE AUG 2015), Miscellaneous Medical/oth (Andrez Inguinal Hernia),     No: Blood Disease, Chemotherapy/Cancer, Deafness or Ringing Ears, Diabetes,     Seizures, Heart Attack, Hemorrhoids/Rectal Prob, High Blood Pressure, Sinus     Trouble            Hx Influenza Vaccination:  Yes      Date Influenza Vaccine Given:  Jan 1, 2018      Influenza Vaccine Declined:  Yes      2 or More Falls Past Year?:  No      Fall Past Year with Injury?:  Yes      Hx Pneumococcal Vaccination:  No      Encouraged to follow-up with:  PCP regarding preventative exams.      Chart initiated by      Jacqueline Zazueta MA            ALLERGIES/MEDICATIONS      Allergies:        Coded Allergies:             NO KNOWN ALLERGIES (Unverified , 3/8/18)      Medications    Last Reconciled on 3/8/18 15:46 by OLIVER PAL MD      predniSONE* (predniSONE*) 5 Mg Tablet      5 MG PO QDAY, #30 TAB 3 Refills         Prov: Oliver Pal         12/18/17       PSEUDOEPHEDRINE HCl (Sudogest) 60 Mg Tablet      60 MG PO TID for 30 Days, #90 TAB 3 Refills         Prov: Oliver Pal         11/27/17       Tiotropium Bromide (Spiriva Respimat 2.5 mcg/Puff) 4 Gm Mist.inhal      2 PUFFS INH QDAY, #1 MDI 11 Refills         Prov: Oliver Pal         10/18/17       Albuterol/Ipratropium (Duoneb) 3 Ml Nebu      3 ML INH RTQ4H, #120 ML         Prov: Oliver Pal         7/31/17       Epinephrine HCl (Epipen 2-Chung) 0.3 Mg/0.3 Ml Auto.injct      0.3 MG IM ASDIR, #2 SYRINGE 11 Refills         Prov: Oliver Pal         6/14/17       Beclomethasone Dipropionate (QVAR 80 Mcg) 8.7 Gm Aer.w.adap      2  PUFFS INH RTBID, #1 INH         Prov: Bonifacio Neff         11/18/16       MDI-Albuterol (Proair HFA) 8.5 Gm Inhaler      2 PUFFS INH BID, #1 MDI 0 Refills         Reported         3/29/16       Montelukast Sodium (Singulair*) 10 Mg Tablet      10 MG PO HS, #30 TAB 0 Refills         Reported         12/2/15      Current Medications      Current Medications Reviewed 3/8/18            EXAM      GEN-patient appears stated age resting comfortable in no acute distress      Eyes-PERRL,  conjunctiva are normal in appearance extraocular muscles are intact    , no scleral icterus      Lymphatic-no swollen or enlarged cervical nodes, or axillary node, or femoral     nodes, or supraclavicular nodes      Mouth normal dentition, no erythema no ulcerations oropharynx appears normal no     exudate no evidence of postnasal drip, MP2      Neck-there are no palpable supraclavicular or cervical adenopathy, thyroid is     normal in appearance no apparent nodules, there is no inspiratory or expiratory     stridor      Respiratory-patient exhibits normal work of breathing, speaking in full     sentences without difficulty, the chest is normal in appearance, clear to     auscultation with no wheezes rales or rhonchi, chest is normal to percussion on     both the right and left sides      Cardiovascular-the heart rate is normal and regular S1 and S2 present with no     murmur or extra heart sounds, there is no JVD or pedal edema present      GI-the abdomen is normal in appearance, bowel sounds present and normal in all     quadrants no hepatosplenomegaly or masses felt      Extremities-no clubbing is present, pulses present in all extremities,     capillary refill time is normal      Musculoskeletal-Normal strength in upper and lower extremities, inspection     shows no evidence of muscle atrophy      Skin-skin is normal in appearance it is warm and dry, no rashes present, no     evidence of cyanosis, palpation reveals no masses       Neurological-the patient is alert and oriented to time place and person, moves     all 4 extremities, gait assisted with a cane, normal affect and mood, CN2-12     intact      Psych-normal judgment and insight is good, normal mood and affect, alert and     oriented to person, place, and time, and date      Vitals      Vitals:             Height 5 ft 4 in / 162.56 cm           Weight 199 lbs 5 oz / 90.422076 kg           BSA 2.06 m2           BMI 34.2 kg/m2           Temperature 97.9 F / 36.61 C - Oral           Pulse 103           Respirations 24           Blood Pressure 146/61 Sitting, Right Arm           Pulse Oximetry 98%, room air            REVIEW      Results Reviewed      PCCS Results Reviewed?:  Yes Prev Lab Results, Yes Prev Radiology Results, Yes     Previous [facility] Records            PLAN      Assessment      Notes      New Medications      * Budesonide/Formoterol Fumarate (Symbicort 160/4.5 Mcg) 10.2 GM INH: 2 PUFF     INH BID #1      * MDI-Albuterol (Ventolin HFA*) 18 GM HFA.AER.AD: 2 PUFFS INH Q4H PRN SHORTNESS     OF BREATH #1      * TIOTROPIUM BROMIDE (Spiriva Respimat 2.5 mcg/Puff) 4 GM MIST.INHAL: 2 PUFFS     INH QDAY #1      * Azithromycin 250 MG TABLET: 250 MG PO QDAY #30      * Beclomethasone Dipropionate (QVAR 80 Mcg) 8.7 GM AER.W.ADAP: 1 PUFFS INH BID #    1      * Albuterol/Ipratropium (Duoneb) 3 ML AMPUL.NEB: 3 ML INH RTQ4H #180       Instructions: DIAGNOSIS CODE REQUIRED PRIOR TO PRESCRIBING.      Discontinued Medications      * Theophylline (Theochron) 300 MG TABCR: 300 MG PO BID #60      * Mometasone/Formoterol (Dulera 200 Mcg/5 Mcg) 13 GM HFA.AER.AD: 2 PUFFS INH     BID #1      * predniSONE* 20 MG TABLET: 20 MG PO QDAY #30      * predniSONE* 20 MG TABLET: 40 MG PO QDAY 10 Days #20      * Azithromycin Z-Chung (Zithromax Z-Chung) 250 MG TABLET: 250 MG PO ASDIR #1       Instructions: Take 500 mg (two tablets) by mouth the first day, then 250 mg (    one tablet) daily until all taken.      New  Diagnostics      * Overnight Oximetry, Routine      * 6 Min Walk With Pulse Ox, Routine       Dx: Shortness of breath - R06.02      ASSESSMENT/PLAN:       1.  Severe persistent uncontrolled asthma. Continue Xolair infusions, Qvar,     Symbicort, Spiriva and albuterol.  The patient needs to be on Symbicort because     he cannot take his inhaled powdered medications.  Continue prednisone wean.      2.  Arthritis.  The patient needs to have hip surgery, okay from a pulmonary     standpoint at this time to proceed with surgery. The patient will be at     moderate risk for postoperative pulmonary complication given the fact that he     is a severe brittle asthmatic, however due to quality of life issues, would     recommend that he have the surgery.      3.  Chronic cough. Continue Tussionex cough syrup.      4. Allergic rhinitis.  Continue SudoGest, Singulair and nasal steroids.      5.  Tobacco abuse with active cigarette smoking.  Stressed the importance of     smoking cessation with patient today.      6.  I have personally reviewed laboratory data, imaging as well as previous     medical records.            Patient Education      Education resources provided:  Yes      Patient Education Provided:  Smoking Cessation                 Disclaimer: Converted document may not contain table formatting or lab diagrams. Please see [employer] for the authenticated document.

## 2021-05-28 NOTE — PROGRESS NOTES
Patient: RASHID GROSS     Acct: [id number]     Report: #[id number]  UNIT #: [id number]     : 1960    Encounter Date:2018  PRIMARY CARE: ELEANOR CARRERA  ***Signed***  --------------------------------------------------------------------------------------------------------------------  Chief Complaint      Encounter Date      Sep 27, 2018            Primary Care Provider      ELEANOR CARRERA            Referring Provider      ELEANOR CARRERA            Patient Complaint      Patient is complaining of      Pt here for 4m f/u, Severe persistent uncontrolled asthma            VITALS      Height 5 ft 4 in / 162.56 cm      Weight 205 lbs 6 oz / 93.290129 kg      BSA 2.09 m2      BMI 35.3 kg/m2      Temperature 98.2 F / 36.78 C - Oral      Pulse 95      Respirations 20      Blood Pressure 150/85 Sitting, Right Arm      Pulse Oximetry 95%, Room air            HPI      The patient is a very pleasant 58 year old  male with severe     asthma here today for follow up.             The patient is much worse than normal. He has missed several injections of his     Xolair because of some administrative issues. Since then he has been having     severe wheezing and shortness of breath. He has significant cough. His symptoms     are worse when he runs out of his medications, Prednisone and SudoGest and his     cough syrup. The patient has daily shortness of breath with all of his     activities of daily living, improved with bronchodilator therapies, when he is     on Xolair and taking Prednisone. The patient has significant cough that is dry     and nonproductive.            ROS      Constitutional:  Denies: Fatigue, Fever, Weight gain, Weight loss, Chills,     Insomnia, Other      Respiratory/Breathing:  Complains of: Shortness of air, Wheezing, Cough; Denies:    Hemoptysis, Pleuritic pain, Other      Endocrine:  Denies: Polydipsia, Polyuria, Heat/cold intolerance, Diabetes, Other      Eyes:   Denies: Blurred vision, Vision Changes, Other      Ears, nose, mouth, throat:  Denies: Mouth lesions, Thrush, Throat pain,     Hoarseness, Allergies/Hay Fever, Post Nasal Drip, Headaches, Recent Head Injury,    Nose Bleeding, Neck Stiffness, Thyroid Mass, Hearing Loss, Ear Fullness, Dry     Mouth, Nasal or Sinus Pain, Dry Lips, Nasal discharge, Nasal congestion, Other      Cardiovascular:  Denies: Palpitations, Syncope, Claudication, Chest Pain, Wake     up Gasping for air, Leg Swelling, Irregular Heart Rate, Cyanosis, Dyspnea on     Exertion, Other      Gastrointestinal:  Denies: Nausea, Constipation, Diarrhea, Abdominal pain,     Vomiting, Difficulty Swallowing, Reflux/Heartburn, Dysphagia, Jaundice,     Bloating, Melena, Bloody stools, Other      Genitourinary:  Denies: Urinary frequency, Incontinence, Hematuria, Urgency,     Nocturia, Dysuria, Testicular problems, Other      Musculoskeletal:  Denies: Joint Pain, Joint Stiffness, Joint Swelling, Myalgias,    Other      Neurological:  Denies: Headache, Numbness, Weakness, Seizures, Other      Psychiatric:  Denies: Anxiety, Appropriate Effect, Depression, Other      Sleep:  No: Excessive daytime sleep, Morning Headache?, Snoring, Insomnia?, Stop    breathing at sleep?, Other      Integumentary:  Denies: Rash, Dry skin, Skin Warm to Touch, Other      Immunologic/Allergic:  Denies: Latex allergy, Seasonal allergies, Asthma,     Urticaria, Eczema, Other      Immunization status:  No: Up to date            FAMILY/SOCIAL/MEDICAL HX      Surgical History:  Yes: Abdominal Surgery (UMBILICAL HERNIA REPAIR), CABG, Head     Surgery (LEFT EYE SX/ORBITAL FX), Orthopedic Surgery (right hip repair, knee     scope); No: Appendectomy, Bladder Surgery, Bowel Surgery, Cholecystectomy, Oral     Surgery, Vascular Surgery      Cancer/Type - Family Hx:  Uncle      Other Family Medical History:  Mother      Is Father Still Living?:  Yes      Is Mother Still Living?:  Yes      Social  "History:  No Tobacco Use, No Alcohol Use, No Recreational Drug use      Smoking status:  Current every day smoker (.5 ppd x 40 y, a few a week since     5/5/18)      Anticoagulation Therapy:  No      Antibiotic Prophylaxis:  No      Medical History:  Yes: Arthritis (RIGHT  HIP AND KNEE), Asthma, Chronic     Bronchitis/COPD (INHALER, NEBS), Congestive Heart Failu, Shortness Of Breath     (ACUTE RESP FAILURE AUG 2015), Miscellaneous Medical/oth (Andrez Inguinal Hernia);     No: Blood Disease, Chemotherapy/Cancer, Deafness or Ringing Ears, Diabetes,     Seizures, Heart Attack, Hemorrhoids/Rectal Prob, High Blood Pressure, Sinus     Trouble      Psychiatric History      None            PREVENTION      Hx Influenza Vaccination:  Yes      Date Influenza Vaccine Given:  Jan 1, 2018      Influenza Vaccine Declined:  Yes      2 or More Falls Past Year?:  No      Fall Past Year with Injury?:  No      Hx Pneumococcal Vaccination:  No      Encouraged to follow-up with:  PCP regarding preventative exams.      Chart initiated by      Aurora Nixon MA            ALLERGIES/MEDICATIONS      Allergies:        Coded Allergies:             MORPHINE (Verified  Adverse Reaction, Unknown, nausea, \"feel funny\",     9/9/18)      Medications    Last Reconciled on 5/31/18 13:57 by COLEEN WEI MD      Mometasone/Formoterol (Dulera 200 Mcg/5 Mcg) 13 Gm Hfa.aer.ad      2 PUFFS INH RTBID, #1 MDI 0 Refills         Reported         9/27/18       Glycopyrrolate/Formoterol Fum (Bevespi Aerosphere Inhaler) 10.7 Gm Hfa.aer.ad      2 PUFFS INH BID, #1 HFA.AER.AD         Reported         9/27/18       Pantoprazole (Protonix*) 20 Mg Tablet.dr      20 MG PO HS for 30 Days, #30 TAB.SR         Prov: MILAGRO SO         9/13/18       (Promethazine/Cod Syrup) 5 ML SYRP No Conflict Check      5 ML PO Q4H PRN for COUGH for 3 Days, #120 ml         Prov: MILAGRO SO         9/13/18       Neb-Budesonide (Budesonide) 0.5 Mg/2 Ml Ampul.neb      0.5 MG INH RTBID for 30 " Days, #60 NEB         Prov: MILAGRO SO         9/13/18       Guaifenesin (Humibid La*) 600 Mg Tab.er.12h      1200 MG PO BID for 10 Days, #40 TAB.ER         Prov: MILAGRO SO         9/13/18       Benzonatate (Benzonatate) 100 Mg Capsule      200 MG PO TID for 7 Days, #42 CAP         Prov: MILAGRO SO         9/13/18       Bisoprolol Fumarate (Bisoprolol Fumarate) 5 Mg Tablet      5 MG PO QDAY for 30 Days, #30 TAB         Prov: MILAGRO SO         9/13/18       Arformoterol Tartrate (Brovana) 15 Mcg/2 Ml Vial.neb      15 MCG INH RTBID for 30 Days, #60 NEB         Prov: MILAGRO SO         9/13/18       Azithromycin (Azithromycin) 250 Mg Tablet      250 MG PO QDAY for 30 Days, #30 TAB         Prov: MILAGRO SO         9/13/18       Epinephrine HCl (Epipen 2-Chung) 0.3 Mg/0.3 Ml Auto.injct      0.3 MG IM ASDIR for 2 Days, #2 SYRINGE 11 Refills         Prov: MILAGRO SO         9/13/18       predniSONE* (predniSONE*) 5 Mg/5 Ml Solution      10 MG PO QDAY, ML         Reported         9/11/18       MDI-Albuterol (Ventolin HFA*) 18 Gm Hfa.aer.ad      2 PUFFS INH Q4H PRN for SHORTNESS OF BREATH, #1 INH 9 Refills         Prov: Oliver Pal         5/31/18       Aspirin (Aspirin Baby *) 81 Mg Tab.chew      81 MG PO QDAY, #30 TAB.CHEW 0 Refills         Reported         5/19/18       Albuterol/Ipratropium (Duoneb) 3 Ml Ampul.neb      3 ML INH RTQ4H, #180 NEB 11 Refills         Prov: Oliver Pal         3/8/18       PSEUDOEPHEDRINE HCl (Sudogest) 60 Mg Tablet      60 MG PO TID for 30 Days, #90 TAB 3 Refills         Prov: Oliver Pal         11/27/17      Current Medications      Current Medications Reviewed 9/27/18            EXAM      GEN-patient appears stated age resting comfortable in no acute distress      Eyes-PERRL,  conjunctiva are normal in appearance extraocular muscles are     intact, no scleral icterus      Lymphatic-no swollen or enlarged cervical nodes, or axillary node, or femoral     nodes, or supraclavicular  nodes      Mouth normal dentition, no erythema no ulcerations oropharynx appears normal no     exudate no evidence of postnasal drip, MP2      Neck-there are no palpable supraclavicular or cervical adenopathy, thyroid is     normal in appearance no apparent nodules, there is no inspiratory or expiratory     stridor      Respiratory-patient exhibits normal work of breathing, speaking in full     sentences without difficulty, the chest is normal in appearance, clear to     auscultation with no rales or rhonchi, there are diffuse expiratory wheezes     heard throughout all lung fields,  chest is normal to percussion on both the     right and left sides      Cardiovascular-the heart rate is normal and regular S1 and S2 present with no     murmur or extra heart sounds, there is no JVD or pedal edema present      GI-the abdomen is normal in appearance, bowel sounds present and normal in all     quadrants no hepatosplenomegaly or masses felt      Extremities-no clubbing is present, pulses present in all extremities, capillary    refill time is normal      skin-skin is normal in appearance it is warm and dry, no rashes present, no     evidence of cyanosis, palpation reveals no masses      Neurological-the patient is alert and oriented to time place and person, moves     all 4 extremities, gait assisted with a cane, normal affect and mood, CN2-12 i    ntact      Psych-normal judgment and insight is good, normal mood and affect, alert and     oriented to person, place, and time, and date      Vtials      Vitals:             Height 5 ft 4 in / 162.56 cm           Weight 205 lbs 6 oz / 93.442973 kg           BSA 2.09 m2           BMI 35.3 kg/m2           Temperature 98.2 F / 36.78 C - Oral           Pulse 95           Respirations 20           Blood Pressure 150/85 Sitting, Right Arm           Pulse Oximetry 95%, Room air            REVIEW      Results Reviewed      PCCS Results Reviewed?:  Yes Prev Lab Results, Yes Prev  Radiology Results, Yes     [facility]            Assessment      Notes      New Medications      * Glycopyrrolate/Formoterol Fum (Bevespi Aerosphere Inhaler) 10.7 GM HFA.AER.AD:      2 PUFFS INH BID #1      * Mometasone/Formoterol (Dulera 200 Mcg/5 Mcg) 13 GM HFA.AER.AD: 2 PUFFS INH       RTBID #1      * Budesonide/Formoterol Fumarate (Symbicort 160/4.5 Mcg) 10.2 GM INH: 2 PUFF INH      BID #1      * TIOTROPIUM BROMIDE (Spiriva Respimat 2.5 mcg/Puff) 4 GM MIST.INHAL: 2 PUFFS       INH QDAY #1      * Beclomethasone Dipropionate (Qvar 80 Redihaler 10.6 GM) 10.6 GM HFA.AEROBA: 2       PUFF INH RTBID #1      * PSEUDOEPHEDRINE HCl (Sudogest) 60 MG TABLET: 60 MG PO Q6H PRN CONGESTION #120      * predniSONE* 10 MG TABLET: 30 MG PO QDAY #90      ASSESSMENT:       1.  Severe uncontrolled persistent  asthma.       2. Peripheral eosinophilia.       3. Elevated IgE level.       4. Chronic cough.       5. Chronic steroid dependence.             PLAN:       1. Continue Xolair infusions as the patient has missed several doses. At this     time, ultimately, I feel that the patient would benefit from an anti-IL5 to help    deplete his eosinophil count given his persistent symptoms despite taking     Xolair. I will try to see if I can get an additional biologic agent given the     patient's severe symptoms.       2. We will increase the patient's Prednisone to 30 mg daily.       3. We will start the patient back on Symbicort 160/4.5 two puffs twice daily     with a spacer. He tends to do better on this medication however he has had some     changes in his insurance and his medications have changed.       4. We will start the patient back on Qvar 80 mg two puffs twice daily.       5. For the patient's severe cough, I feel he is having severe cough which is     causing significant bronchospasm. We will start the patient on codeine/Phenergan    5 ml every 6 hours PRN for cough.      6. We will give the patient influenza  shot today.       7. Continue Singulair.       8. Start patient back on Spiriva 2.5 mg daily.       9. Continue albuterol.      10.  Continue Xolair infusions. I stressed the importance to the patient of     taking his Xolair on a daily basis.       11. I have personally reviewed laboratory data, imaging as well as previous     medical records.                 Disclaimer: Converted document may not contain table formatting or lab diagrams. Please see [employer] for the authenticated document.

## 2021-05-28 NOTE — PROGRESS NOTES
Patient: RASHID GROSS     Acct: [id number]     Report: #[id number]  UNIT #: [id number]     : 1960    Encounter Date:2018  PRIMARY CARE: ELEANOR CARRERA  ***Signed***  --------------------------------------------------------------------------------------------------------------------  Chief Complaint      Encounter Date      2018            Primary Care Provider      ELEANOR CARRERA            Referring Provider      ELEANOR CARRERA            Patient Complaint      Patient is complaining of      Severe persistent asthma ,med refills            VITALS      Height 5 ft 4 in / 162.56 cm      Weight 212 lbs 0 oz / 96.392131 kg      BSA 2.01 m2      BMI 36.4 kg/m2      Pulse 102      Respirations 14      Blood Pressure 157/101 Sitting, Left Arm      Pulse Oximetry 92%, ROOM AIR            HPI      The patient is a very pleasant 58 year old  male who is a     patient of Dr. Myrick. He has a history of severe persistent asthma now back     on Xolair infusions that were stopped for several months secondary to insurance     issues. I last saw the patient about a month ago and at that time he was doing     better being back on Xolair.  He is having some improvement in his dyspnea,     coughing and wheezing although he chronically has some cough and wheezing. The     patient states that he has never gone to an office visit where he has not been     he was wheezing. He is using Symbicort 160/4.5 two puffs twice daily, Qvar 80     and Spiriva Respimat 2.5 and feels they all help. He chronically takes codeine     Phenergan cough syrup that we provide for him for nocturnal cough and needs more    of that today. He is on Singulair and daily Azithromycin and says that his     nebulizer machine has just broken so he needs a new one. He uses DuoNeb as     needed and is requesting a refill today.             I have reviewed his Review of Systems medical, surgical and family history and      agree with those as entered.             Copies To:   Oliver Pal      Constitutional:  Denies: Fatigue, Fever, Weight gain, Weight loss, Chills,     Insomnia, Other      Respiratory/Breathing:  Complains of: Shortness of air, Wheezing; Denies: Cough,    Hemoptysis, Pleuritic pain, Other      Endocrine:  Denies: Polydipsia, Polyuria, Heat/cold intolerance, Diabetes, Other      Eyes:  Denies: Blurred vision, Vision Changes, Other      Ears, nose, mouth, throat:  Denies: Mouth lesions, Thrush, Throat pain,     Hoarseness, Allergies/Hay Fever, Post Nasal Drip, Headaches, Recent Head Injury,    Nose Bleeding, Neck Stiffness, Thyroid Mass, Hearing Loss, Ear Fullness, Dry     Mouth, Nasal or Sinus Pain, Dry Lips, Nasal discharge, Nasal congestion, Other      Cardiovascular:  Denies: Palpitations, Syncope, Claudication, Chest Pain, Wake     up Gasping for air, Leg Swelling, Irregular Heart Rate, Cyanosis, Dyspnea on     Exertion, Other      Gastrointestinal:  Denies: Nausea, Constipation, Diarrhea, Abdominal pain,     Vomiting, Difficulty Swallowing, Reflux/Heartburn, Dysphagia, Jaundice,     Bloating, Melena, Bloody stools, Other      Genitourinary:  Denies: Urinary frequency, Incontinence, Hematuria, Urgency,     Nocturia, Dysuria, Testicular problems, Other      Musculoskeletal:  Denies: Joint Pain, Joint Stiffness, Joint Swelling, Myalgias,    Other      Hematologic/lymphatic:  DENIES: Lymphadenopathy, Bruising, Bleeding tendencies,     Other      Neurological:  Denies: Headache, Numbness, Weakness, Seizures, Other      Psychiatric:  Denies: Anxiety, Appropriate Effect, Depression, Other      Sleep:  No: Excessive daytime sleep, Morning Headache?, Snoring, Insomnia?, Stop    breathing at sleep?, Other      Integumentary:  Denies: Rash, Dry skin, Skin Warm to Touch, Other      Immunologic/Allergic:  Denies: Latex allergy, Seasonal allergies, Asthma,     Urticaria, Eczema, Other      Immunization  "status:  No: Up to date            FAMILY/SOCIAL/MEDICAL HX      Surgical History:  Yes: Abdominal Surgery (UMBILICAL HERNIA REPAIR), CABG, Head     Surgery (LEFT EYE SX/ORBITAL FX), Orthopedic Surgery (right hip repair, knee     scope); No: Appendectomy, Bladder Surgery, Bowel Surgery, Cholecystectomy, Oral     Surgery, Vascular Surgery      Cancer/Type - Family Hx:  Uncle      Other Family Medical History:  Mother      Is Father Still Living?:  Yes      Is Mother Still Living?:  Yes      Social History:  No Tobacco Use, No Alcohol Use, No Recreational Drug use      Smoking status:  Current every day smoker (.5 ppd x 40 y, a few a week since     5/5/18)      Anticoagulation Therapy:  No      Antibiotic Prophylaxis:  No      Medical History:  Yes: Arthritis (RIGHT  HIP AND KNEE), Asthma, Chronic     Bronchitis/COPD (INHALER, NEBS), Congestive Heart Failu, Shortness Of Breath     (ACUTE RESP FAILURE AUG 2015), Miscellaneous Medical/oth (Andrez Inguinal Hernia);     No: Blood Disease, Chemotherapy/Cancer, Deafness or Ringing Ears, Diabetes, Seiz    ures, Heart Attack, Hemorrhoids/Rectal Prob, High Blood Pressure, Sinus Trouble            PREVENTION      Hx Influenza Vaccination:  No      Influenza Vaccine Declined:  Yes      2 or More Falls Past Year?:  No      Fall Past Year with Injury?:  No      Hx Pneumococcal Vaccination:  No      Encouraged to follow-up with:  PCP regarding preventative exams.      Chart initiated by      Shana Marquez ma            ALLERGIES/MEDICATIONS      Allergies:        Coded Allergies:             MORPHINE (Verified  Adverse Reaction, Unknown, nausea, \"feel funny\",     11/28/18)      Medications    Last Reconciled on 11/28/18 16:08 by MILLER BARBOUR      Albuterol/Ipratropium (Duoneb) 3 Ml Ampul.neb      3 ML INH RTQ4H, #180 NEB 11 Refills         Prov: Cammy Hughes PA-C         11/28/18       Montelukast Sodium (Singulair*) 10 Mg Tablet      10 MG PO HS, #30 TAB 6 Refills         " Prov: Cammy Hughes PA-C         10/25/18       Azithromycin (Azithromycin) 250 Mg Tablet      250 MG PO QDAY for 30 Days, #30 TAB         Prov: Cammy Hughes PA-C         10/25/18       predniSONE* (predniSONE*) 10 Mg Tablet      30 MG PO QDAY, #90 TAB 2 Refills         Prov: Oliver Pal         9/27/18       PSEUDOEPHEDRINE HCl (Sudogest) 60 Mg Tablet      60 MG PO Q6H PRN for CONGESTION, #120 TAB 6 Refills         Prov: Oliver Pal         9/27/18       Beclomethasone Dipropionate (Qvar 80 Redihaler 10.6 GM) 10.6 Gm Hfa.aeroba      2 PUFF INH RTBID, #1 INH         Prov: Oliver Pal         9/27/18       Tiotropium Bromide (Spiriva Respimat 2.5 mcg/Puff) 4 Gm Mist.inhal      2 PUFFS INH QDAY, #1 MDI 11 Refills         Prov: Oliver Pal         9/27/18       Budesonide/Formoterol Fumarate (Symbicort 160/4.5 Mcg) 10.2 Gm Inh      2 PUFF INH BID, #1 INH 11 Refills         Prov: Oliver Pal         9/27/18       Pantoprazole (Protonix*) 20 Mg Tablet.dr      20 MG PO HS for 30 Days, #30 TAB.SR         Prov: MILAGRO SO         9/13/18       (Promethazine/Cod Syrup) 5 ML SYRP No Conflict Check      5 ML PO Q4H PRN for COUGH for 3 Days, #120 ml         Prov: MILAGRO SO         9/13/18       Neb-Budesonide (Budesonide) 0.5 Mg/2 Ml Ampul.neb      0.5 MG INH RTBID for 30 Days, #60 NEB         Prov: MILAGRO SO         9/13/18       Guaifenesin (Humibid La*) 600 Mg Tab.er.12h      1200 MG PO BID for 10 Days, #40 TAB.ER         Prov: MILAGRO SO         9/13/18       Benzonatate (Benzonatate) 100 Mg Capsule      200 MG PO TID for 7 Days, #42 CAP         Prov: MILAGRO SO         9/13/18       Bisoprolol Fumarate (Bisoprolol Fumarate) 5 Mg Tablet      5 MG PO QDAY for 30 Days, #30 TAB         Prov: MILAGRO SO         9/13/18       Arformoterol Tartrate (Brovana) 15 Mcg/2 Ml Vial.neb      15 MCG INH RTBID for 30 Days, #60 NEB         Prov: MILAGRO SO         9/13/18       Epinephrine HCl (Epipen 2-Chung) 0.3  Mg/0.3 Ml Auto.injct      0.3 MG IM ASDIR for 2 Days, #2 SYRINGE 11 Refills         Prov: MILAGRO SO         9/13/18       predniSONE* (predniSONE*) 5 Mg/5 Ml Solution      10 MG PO QDAY, ML         Reported         9/11/18       MDI-Albuterol (Ventolin HFA) 18 Gm Hfa.aer.ad      2 PUFFS INH Q4H PRN for SHORTNESS OF BREATH, #1 INH 9 Refills         Prov: DemarcoOliver         5/31/18       Aspirin Chew (Aspirin Baby) 81 Mg Tab.chew      81 MG PO QDAY, #30 TAB.CHEW 0 Refills         Reported         5/19/18      Current Medications      Current Medications Reviewed 11/28/18            EXAM      GEN-patient appears stated age resting comfortable in no acute distress      Eyes-PERRL,  conjunctiva are normal in appearance extraocular muscles are     intact, no scleral icterus      Nasal-both nares are patent turbinates appear normal no polyps seen no nasal     discharge or ulcerations      Ears-tympanic membranes are normal no erythema no bulging, normal to inspection      Lymphatic-no swollen or enlarged cervical nodes, or axillary node, or femoral     nodes, or supraclavicular nodes      Mouth normal dentition, no erythema no ulcerations oropharynx appears normal no     exudate no evidence of postnasal drip, MP(default value)      Neck-there are no palpable supraclavicular or cervical adenopathy, thyroid is     normal in appearance no apparent nodules, there is no inspiratory or expiratory     stridor      Respiratory-trace expiratory wheezing throughout, no rhonchi or crackles     appreciated, normal work of breathing noted.       Cardiovascular-the heart rate is normal and regular S1 and S2 present with no     murmur or extra heart sounds, there is no JVD or pedal edema present      GI-the abdomen is normal in appearance, bowel sounds present and normal in all     quadrants no hepatosplenomegaly or masses felt      Extremities-no clubbing is present, pulses present in all extremities, capillary    refill time is  normal      Musculoskeletal-Normal strength in upper and lower extremities, inspection shows    no evidence of muscle atrophy      Skin-skin is normal in appearance it is warm and dry, no rashes present, no     evidence of cyanosis, palpation reveals no masses      Neurological-the patient is alert and oriented to time place and person, moves     all 4 extremities, normal gait, normal affect and mood, CN2-12 intact      Psych-normal judgment and insight is good, normal mood and affect, alert and     oriented to person, place, and time, and date      Vtials      Vitals:             Height 5 ft 4 in / 162.56 cm           Weight 212 lbs 0 oz / 96.508287 kg           BSA 2.01 m2           BMI 36.4 kg/m2           Pulse 102           Respirations 14           Blood Pressure 157/101 Sitting, Left Arm           Pulse Oximetry 92%, ROOM AIR            REVIEW      Results Reviewed      [facility] Results Reviewed?:  Yes Prev Lab Results, Yes Prev Radiology Results, Yes     Previous [facility] Records            Assessment      Notes      Renewed Medications      * Albuterol/Ipratropium (Duoneb) 3 ML AMPUL.NEB: 3 ML INH RTQ4H #180         Instructions: DIAGNOSIS CODE REQUIRED PRIOR TO PRESCRIBING.      ASSESSMENT:      1. Severe persistent asthma with control improving on Xolair.      2. Peripheral eosinophilia.      3. Elevated IgE level.      4. Chronic cough, worse at night.      5. Chronic steroid dependence.            PLAN:       1. At this time, I will continue the patient on Xolair injections, Symbicort     160/4.5 two puffs twice daily, Qvar 80, Spiriva Respimat 2.5, daily Azithromycin    and Singulair.  Continue daily dose of Prednisone and we have given him another     prescription for his codeine/Phenergan cough syrup.        2. The patient is up to date on his flu and pneumonia vaccines.       3. I will have him followup in 2-3 months with Dr. Pal or sooner if needed.            Patient Education      Patient Education  Provided:  Acute Asthma, How to use an Inhaler, How to use a     Nebulizer      Time Spent:  > 50% /[facility]                 Disclaimer: Converted document may not contain table formatting or lab diagrams. Please see [employer] for the authenticated document.   EMT/paramedic